# Patient Record
Sex: FEMALE | Race: BLACK OR AFRICAN AMERICAN | NOT HISPANIC OR LATINO
[De-identification: names, ages, dates, MRNs, and addresses within clinical notes are randomized per-mention and may not be internally consistent; named-entity substitution may affect disease eponyms.]

---

## 2022-02-03 PROBLEM — Z00.00 ENCOUNTER FOR PREVENTIVE HEALTH EXAMINATION: Status: ACTIVE | Noted: 2022-02-03

## 2022-02-08 ENCOUNTER — APPOINTMENT (OUTPATIENT)
Dept: OTOLARYNGOLOGY | Facility: CLINIC | Age: 42
End: 2022-02-08

## 2022-02-18 ENCOUNTER — APPOINTMENT (OUTPATIENT)
Dept: OTOLARYNGOLOGY | Facility: CLINIC | Age: 42
End: 2022-02-18
Payer: COMMERCIAL

## 2022-02-18 VITALS
HEIGHT: 61 IN | HEART RATE: 82 BPM | BODY MASS INDEX: 32.1 KG/M2 | WEIGHT: 170 LBS | SYSTOLIC BLOOD PRESSURE: 120 MMHG | DIASTOLIC BLOOD PRESSURE: 81 MMHG

## 2022-02-18 DIAGNOSIS — Z82.49 FAMILY HISTORY OF ISCHEMIC HEART DISEASE AND OTHER DISEASES OF THE CIRCULATORY SYSTEM: ICD-10-CM

## 2022-02-18 DIAGNOSIS — Z78.9 OTHER SPECIFIED HEALTH STATUS: ICD-10-CM

## 2022-02-18 PROCEDURE — 99203 OFFICE O/P NEW LOW 30 MIN: CPT

## 2022-02-18 NOTE — HISTORY OF PRESENT ILLNESS
[de-identified] : 41 year old female initial consultation for throat pain. Patient reports throat pain started 6 months. Reports dry throat in the night. Patient reports intermittent right otalgia that occurs when her throat hurts.  Denies fevers, recent infections, coughing dysphagia, odynophagia, aspirations, dyspnea, dysphonia, otorrhea, ear infections, hearing loss, tinnitus, dizziness, vertigo, headaches related to hearing. CT scan of Neck 1/22/2022: Impression: NO neck mass or pathologic by CT size criteria cervical lymphadenopathy. Elongation of bilateral styloid processes and calcification of bilateral styloid.  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

## 2023-04-22 ENCOUNTER — EMERGENCY (EMERGENCY)
Facility: HOSPITAL | Age: 43
LOS: 1 days | Discharge: HOME | End: 2023-04-22
Attending: EMERGENCY MEDICINE
Payer: COMMERCIAL

## 2023-04-22 VITALS
DIASTOLIC BLOOD PRESSURE: 84 MMHG | RESPIRATION RATE: 18 BRPM | OXYGEN SATURATION: 100 % | WEIGHT: 151.9 LBS | SYSTOLIC BLOOD PRESSURE: 144 MMHG | TEMPERATURE: 98 F | HEART RATE: 100 BPM

## 2023-04-22 PROCEDURE — 99283 EMERGENCY DEPT VISIT LOW MDM: CPT

## 2023-04-22 NOTE — ED PROVIDER NOTE - NSFOLLOWUPINSTRUCTIONS_ED_ALL_ED_FT
Use Over the counter Flonase nasal spray  Decongestants over the counter,   follow up ENT this week   return to ER if any increase in symptoms

## 2023-04-22 NOTE — ED PROVIDER NOTE - PATIENT PORTAL LINK FT
You can access the FollowMyHealth Patient Portal offered by [facility] by registering at the following website: [email or web address]. By joining [employer]’s FollowMyHealth portal, you will also be able to view your health information using other applications (apps) compatible with our system.

## 2023-04-22 NOTE — ED PROVIDER NOTE - RESPIRATORY, MLM
The patient is a 65y Female complaining of suture/staple removal. Breath sounds clear and equal bilaterally.

## 2023-04-22 NOTE — ED PROVIDER NOTE - CLINICAL SUMMARY MEDICAL DECISION MAKING FREE TEXT BOX
PMD or clinic follow up recommended for reassessment. Patient is aware of signs/symptoms to return to the emergency department.

## 2023-04-22 NOTE — ED ADULT NURSE NOTE - NS_SISCREENINGSR_GEN_ALL_ED
Pap guidelines reviewed  Pap deferred secondary to negative pap and HPV in 2017 in this low risk patient  Recommend Calcium 1200mg in two divided doses  Vitamin D3 2,000-4,000 IU daily   Weight bearing exercises 3-4x's a week for 30-40min  Return to office for annual or as needed 
Negative

## 2023-04-22 NOTE — ED PROVIDER NOTE - OBJECTIVE STATEMENT
Patient is a 42 year old female who presents c/o clogged ears, congestion, sore throat x 2 weeks. no fever, no cough, no abdominal pain, no ha, no vertigo.

## 2023-04-22 NOTE — ED PROVIDER NOTE - NS ED ATTENDING STATEMENT MOD
This was a shared visit with the STANISLAV. I reviewed and verified the documentation and independently performed the documented:

## 2023-04-22 NOTE — ED PROVIDER NOTE - IV ALTEPLASE EXCL ABS HIDDEN
Start Artificial Tears: One drop to both eyes 3-4 times daily. We recommend Systane or Refresh lubricating eye drops which can be found at any pharmacy. show

## 2023-04-22 NOTE — ED ADULT NURSE NOTE - OBJECTIVE STATEMENT
Pt presents with  c/o clogged ears, congestion, sore throat x 2 weeks. no fever, no cough, no abdominal pain, no ha, no vertigo.

## 2023-04-22 NOTE — ED PROVIDER NOTE - CARE PROVIDER_API CALL
Alexandro Bui (MD)  Otolaryngology  [address], [address]  [place] [address]  Phone: [phone]  Fax: [phone]  Follow Up Time:

## 2023-04-28 PROBLEM — Z78.9 OTHER SPECIFIED HEALTH STATUS: Chronic | Status: ACTIVE | Noted: 2023-04-22

## 2023-05-30 ENCOUNTER — NON-APPOINTMENT (OUTPATIENT)
Age: 43
End: 2023-05-30

## 2023-05-30 ENCOUNTER — APPOINTMENT (OUTPATIENT)
Dept: OTOLARYNGOLOGY | Facility: CLINIC | Age: 43
End: 2023-05-30
Payer: COMMERCIAL

## 2023-05-30 VITALS
TEMPERATURE: 97.3 F | HEIGHT: 67 IN | HEART RATE: 88 BPM | BODY MASS INDEX: 24.8 KG/M2 | WEIGHT: 158 LBS | DIASTOLIC BLOOD PRESSURE: 79 MMHG | SYSTOLIC BLOOD PRESSURE: 123 MMHG

## 2023-05-30 DIAGNOSIS — R68.84 JAW PAIN: ICD-10-CM

## 2023-05-30 PROCEDURE — 92557 COMPREHENSIVE HEARING TEST: CPT

## 2023-05-30 PROCEDURE — 99213 OFFICE O/P EST LOW 20 MIN: CPT | Mod: 25

## 2023-05-30 PROCEDURE — 92567 TYMPANOMETRY: CPT

## 2023-05-30 NOTE — ASSESSMENT
[FreeTextEntry1] : Ms. 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 Hopkins has since moved into the city, she prefers to see someone in [place].  I will refer her to head and neck at [facility]

## 2023-05-30 NOTE — HISTORY OF PRESENT ILLNESS
[de-identified] : previously seen for throat pain by Dr. Hopkins.  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

## 2023-05-30 NOTE — END OF VISIT
[FreeTextEntry3] : I personally saw and examined OCTAVIO GONZALEZ in detail.  I spoke to MELVA Slater regarding the assessment and plan of care. I performed the procedures and relevant physical exam.  I have reviewed the above assessment and plan of care and I agree.  I have made changes to the body of the note wherever necessary and appropriate.\par

## 2023-05-30 NOTE — CONSULT LETTER
[Dear  ___] : Dear  [unfilled], [Consult Letter:] : I had the pleasure of evaluating your patient, [unfilled]. [Sincerely,] : Sincerely, [FreeTextEntry3] : Lisbet Harris MD\par Otolaryngology- Facial Plastics \par [address]\[place] [address]\par (P) - [phone]\par (F) - [phone]\par

## 2023-06-12 ENCOUNTER — APPOINTMENT (OUTPATIENT)
Dept: OTOLARYNGOLOGY | Facility: CLINIC | Age: 43
End: 2023-06-12
Payer: COMMERCIAL

## 2023-06-12 VITALS
OXYGEN SATURATION: 99 % | SYSTOLIC BLOOD PRESSURE: 132 MMHG | WEIGHT: 158 LBS | TEMPERATURE: 97.3 F | HEIGHT: 67 IN | HEART RATE: 98 BPM | DIASTOLIC BLOOD PRESSURE: 80 MMHG | BODY MASS INDEX: 24.8 KG/M2

## 2023-06-12 DIAGNOSIS — M26.609 UNSPECIFIED TEMPOROMANDIBULAR JOINT DISORDER: ICD-10-CM

## 2023-06-12 DIAGNOSIS — H93.13 TINNITUS, BILATERAL: ICD-10-CM

## 2023-06-12 PROCEDURE — 99215 OFFICE O/P EST HI 40 MIN: CPT

## 2023-06-12 NOTE — DATA REVIEWED
[de-identified] : Audiogram reviewed - normal hearing, type A tympanograms, good SDS. [de-identified] : CT Neck from [facility] reviewed - incompletely calcified stylohyoid ligaments bilaterally.

## 2023-06-12 NOTE — HISTORY OF PRESENT ILLNESS
[de-identified] : pt of Dr. 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 Harris to continue f/u here since Dr. Hopkins is in [place] now.  Pt c.o noise both ear with cracking sound for one year, mild hearing loss, + dry throat.  No tinnitus, no pain. no sorethroat or dysphagia. audio done 5/3023 wnl by Dr. Harris.

## 2023-06-12 NOTE — PHYSICAL EXAM
[de-identified] : Slight TTP along the TMJ. [Midline] : trachea located in midline position [Normal] : no rashes